# Patient Record
Sex: MALE | Race: OTHER | HISPANIC OR LATINO | URBAN - METROPOLITAN AREA
[De-identification: names, ages, dates, MRNs, and addresses within clinical notes are randomized per-mention and may not be internally consistent; named-entity substitution may affect disease eponyms.]

---

## 2021-08-01 ENCOUNTER — EMERGENCY (EMERGENCY)
Facility: HOSPITAL | Age: 31
LOS: 1 days | Discharge: HOME | End: 2021-08-01
Attending: STUDENT IN AN ORGANIZED HEALTH CARE EDUCATION/TRAINING PROGRAM
Payer: SELF-PAY

## 2021-08-01 VITALS
RESPIRATION RATE: 14 BRPM | TEMPERATURE: 99 F | HEART RATE: 91 BPM | DIASTOLIC BLOOD PRESSURE: 89 MMHG | HEIGHT: 68 IN | SYSTOLIC BLOOD PRESSURE: 144 MMHG | OXYGEN SATURATION: 97 % | WEIGHT: 179.9 LBS

## 2021-08-01 PROCEDURE — 99283 EMERGENCY DEPT VISIT LOW MDM: CPT | Mod: 25

## 2021-08-01 PROCEDURE — 12002 RPR S/N/AX/GEN/TRNK2.6-7.5CM: CPT

## 2021-08-01 PROCEDURE — 12032 INTMD RPR S/A/T/EXT 2.6-7.5: CPT

## 2021-08-01 PROCEDURE — 90471 IMMUNIZATION ADMIN: CPT

## 2021-08-01 PROCEDURE — 90715 TDAP VACCINE 7 YRS/> IM: CPT

## 2021-08-01 RX ORDER — TETANUS TOXOID, REDUCED DIPHTHERIA TOXOID AND ACELLULAR PERTUSSIS VACCINE, ADSORBED 5; 2.5; 8; 8; 2.5 [IU]/.5ML; [IU]/.5ML; UG/.5ML; UG/.5ML; UG/.5ML
0.5 SUSPENSION INTRAMUSCULAR ONCE
Refills: 0 | Status: COMPLETED | OUTPATIENT
Start: 2021-08-01 | End: 2021-08-01

## 2021-08-01 RX ORDER — IBUPROFEN 200 MG
600 TABLET ORAL ONCE
Refills: 0 | Status: COMPLETED | OUTPATIENT
Start: 2021-08-01 | End: 2021-08-01

## 2021-08-01 RX ADMIN — Medication 600 MILLIGRAM(S): at 23:20

## 2021-08-01 RX ADMIN — TETANUS TOXOID, REDUCED DIPHTHERIA TOXOID AND ACELLULAR PERTUSSIS VACCINE, ADSORBED 0.5 MILLILITER(S): 5; 2.5; 8; 8; 2.5 SUSPENSION INTRAMUSCULAR at 22:42

## 2021-08-01 NOTE — ED STATDOCS - PHYSICAL EXAMINATION
Vital Signs per nursing documentation  Gen: well appearing, no acute distress  HEENT: NCAT, MMM  Cardiac: regular rate rhythm, normal S1S2  Chest: clear to auscultation bilateral, no wheezes or crackles  Abdomen: soft, non tender non distended  Extremity: (+)4cm laceration to right lower extremity. no gross deformity, good perfusion  Skin: no rash  Msk: no midline spinal tenderness,  Neuro: nonfocal neuro exam, gait steady
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
## 2021-08-01 NOTE — ED STATDOCS - NSFOLLOWUPINSTRUCTIONS_ED_ALL_ED_FT
Cuidado de un desgarro, en adultos    Laceration Care, Adult      Un desgarro es un arvin que puede atravesar todas las capas de la piel hasta el tejido que se encuentra debajo de la piel. Algunos desgarros cicatrizan por sí solos. Otros se deben cerrar con puntos (suturas), grapas, tiras adhesivas para la piel o goma para cerrar la piel. El cuidado adecuado de un desgarro reduce el riesgo de infección, ayuda a que el desgarro cierre mejor, y puede prevenir la formación de cicatrices.      Cómo cuidar del desgarro    Lávese las paul con agua y jabón antes de tocarse la herida y cambiar la venda (vendaje). Use desinfectante para paul si no dispone de agua y jabón.    Mantenga la herida limpia y seca.    Si le colocaron un vendaje, cámbielo al menos ramiro vez al día o cady se lo haya indicado el médico. También debe cambiarlo si se moja o se ensucia.    Si se utilizaron suturas o grapas:     •Mantenga la herida completamente seca valeria las primeras 24 horas o cady se lo haya indicado el médico. Transcurrido jessica tiempo, puede ducharse o ryan zoila de inmersión. No obstante, asegúrese de no sumergir la herida en agua hasta que le hayan quitado las suturas o las grapas.    •Limpie la herida ramiro vez por día o cady se lo haya indicado el médico:  •Lave la herida con agua y jabón.      •Enjuáguela con agua para quitar todo el jabón.      •Séquela dando palmaditas con ramiro toalla limpia. No frote la herida.        •Después de limpiar la herida, aplique ramiro delgada capa de ungüento con antibiótico cady se lo haya indicado el médico. Normal ayudará a prevenir infecciones y a evitar que el vendaje se adhiera a la herida.      •[place] que le retiren las suturas o las grapas cady se lo haya indicado el médico.      Si se utilizaron tiras adhesivas para la piel:     • No deje que las tiras adhesivas para la piel se mojen. Puede bañarse o ducharse, karthikeyan tenga cuidado de no mojar la herida.      •Si se moja, séquela dando palmaditas con ramiro toalla limpia. No frote la herida.      •Las tiras adhesivas para la piel se caen solas. Puede recortar las tiras a medida que la herida se salud. No quite las tiras adhesivas para la piel que aún estén pegadas a la herida. Estas se caerán cuando sea el momento.      Si se utilizó goma para cerrar la piel:     •Trate de mantener la herida seca; sin embargo, puede mojarla ligeramente cuando se bañe o se duche. No sumerja la herida en el agua, por ejemplo, al nadar.      •Después de ducharse o bañarse, seque la herida con cuidado dando palmaditas con ramiro toalla limpia. No frote la herida.      • No practique actividades que lo tacho transpirar mucho hasta que la goma para cerrar la piel se haya caído taniya.      • No aplique líquidos, cremas ni ungüentos medicinales en la herida mientras todavía tenga la goma para cerrar la piel. De lo contrario, puede hacer que la goma se despegue antes de que la herida cicatrice.      •Si la herida está cubierta con un vendaje, tenga cuidado de no aplicar cinta adhesiva directamente sobre la goma para cerrar la piel. De lo contrario, puede hacer que la goma se despegue antes de que la herida cicatrice.      • No toque la goma. La goma para cerrar la piel generalmente permanece sobre la piel de 5 a 10 días y luego se  taniya.        Indicaciones generales      •[place] los medicamentos de venta leon y los recetados solamente cady se lo haya indicado el médico.      •Si le recetaron un medicamento o ungüento con antibiótico, tómelo o aplíqueselo cady se lo haya indicado el médico. No deje de usarlo aunque la afección mejore.      • No se rasque ni se toque la herida.    •Controle la herida todos los días para detectar signos de infección. Esté atento a lo siguiente:  •Dolor, hinchazón o enrojecimiento.      •Líquido, amber o pus.        •Valeria las primeras 24 a 48 horas después de que le hayan reparado el desgarro, cuando esté sentado o acostado, levante (eleve) la laura de la lesión por encima del nivel del corazón.    •Si se lo indican, aplique hielo sobre la laura afectada:  •Ponga el hielo en ramiro bolsa plástica.      •Coloque ramiro toalla entre la piel y la bolsa de hielo.      •Coloque el hielo valeria 20 minutos, 2 a 3 veces por día.        •Concurra a todas las visitas de seguimiento cady se lo haya indicado el médico. Normal es importante.        Comuníquese con un médico si:    •Le colocaron la vacuna antitetánica y tiene hinchazón, dolor intenso, enrojecimiento o hemorragia en el sitio de la inyección.      •Tiene fiebre.      •Ramiro herida que estaba cerrada y se abre.      •Percibe que sale mal olor de la herida o del vendaje.      •Nota un cuerpo extraño en la herida, cady un trozo de pina o judd.      •El dolor no se chula con los medicamentos.      •Presenta un aumento del enrojecimiento, la hinchazón o el dolor en el lugar de la herida.      •Presenta líquido, amber o pus que provienen de la herida.      •Debe cambiar el vendaje con frecuencia debido al drenaje de líquido, amber o pus proveniente de la herida.      •Presenta ramiro nueva erupción cutánea.      •Presenta adormecimiento alrededor de la herida.        Solicite ayuda de inmediato si:    •Tiene mucha hinchazón alrededor de la herida.      •El dolor aumenta repentinamente y es intenso.      •Presenta nódulos dolorosos cerca de la herida o en la piel en cualquier laura del cuerpo.      •Tiene ramiro línea jose que sale de la herida.      •La herida está en la mano o en el pie y no puede  correctamente isabela de los dedos.      •La herida está en la mano o en el pie y observa que los dedos tienen un melody pálido o azulado.        Resumen    •Un desgarro es un arvin que puede atravesar todas las capas de la piel hasta el tejido que se encuentra debajo de la piel.      •Algunos desgarros cicatrizan por sí solos. Otros se deben cerrar con puntos (suturas), grapas, tiras adhesivas para la piel o goma para cerrar la piel.      •El cuidado adecuado de un desgarro reduce el riesgo de infección, ayuda a que el desgarro cierre mejor, y previene la formación de cicatrices.      Esta información no tiene cady fin reemplazar el consejo del médico. Asegúrese de hacerle al médico cualquier pregunta que tenga.

## 2021-08-01 NOTE — ED STATDOCS - NS ED ROS FT
ROS:  GEN: (-) fevers/chills  NECK: (-) stiffness, (-) swelling  RESP: (-) shortness of breath, (-) cough  CV: (-) chest pain, (-) palpitations  GI: (-) nausea, (-) vomiting, (-) pain, (-) constipation, (-) diarrhea  : (-) hematuria, (-) dysuria  EXT: (+) laceration to right knee   NEURO: (-) weakness, (-) headache, (-) dizziness, (-) syncope  MSK: (-) muscle pain
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
## 2021-08-01 NOTE — ED STATDOCS - PATIENT PORTAL LINK FT
You can access the FollowMyHealth Patient Portal offered by [facility] by registering at the following website: [email or web address]. By joining [employer]’s FollowMyHealth portal, you will also be able to view your health information using other applications (apps) compatible with our system.
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
## 2021-08-10 ENCOUNTER — EMERGENCY (EMERGENCY)
Facility: HOSPITAL | Age: 31
LOS: 1 days | Discharge: HOME | End: 2021-08-10
Attending: STUDENT IN AN ORGANIZED HEALTH CARE EDUCATION/TRAINING PROGRAM
Payer: SELF-PAY

## 2021-08-10 VITALS
RESPIRATION RATE: 18 BRPM | TEMPERATURE: 98 F | SYSTOLIC BLOOD PRESSURE: 136 MMHG | HEIGHT: 68 IN | OXYGEN SATURATION: 97 % | DIASTOLIC BLOOD PRESSURE: 85 MMHG | HEART RATE: 91 BPM

## 2021-08-10 PROCEDURE — G0463: CPT

## 2021-08-10 PROCEDURE — L9995: CPT

## 2021-08-10 NOTE — ED PROVIDER NOTE - PATIENT PORTAL LINK FT
You can access the FollowMyHealth Patient Portal offered by [facility] by registering at the following website: [email or web address]. By joining [employer]’s FollowMyHealth portal, you will also be able to view your health information using other applications (apps) compatible with our system.

## 2021-08-10 NOTE — ED PROVIDER NOTE - CLINICAL SUMMARY MEDICAL DECISION MAKING FREE TEXT BOX
sutures removed. Pt given wound care instructions. will d/c with outpatient f/up and return instructions.

## 2021-08-10 NOTE — ED PROVIDER NOTE - PHYSICAL EXAMINATION
Constitutional - well-developed; well nourished. Head - NCAT. Airway patent. Neuro - COKER. Skin - No rash. R medial knee with healing wound. small area of devitalized tissue.  MSK - normal ROM.
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
## 2021-08-10 NOTE — ED PROVIDER NOTE - NS ED ROS FT
No fever/chills, No ear pain/sore throat, no SOB/cough/wheeze/stridor, No abdominal pain, No N/V/D, No neck/back pain, no rash, no changes in neurological status/function.

## 2021-08-11 PROBLEM — Z78.9 OTHER SPECIFIED HEALTH STATUS: Chronic | Status: ACTIVE | Noted: 2021-08-01
